# Patient Record
Sex: MALE | Race: WHITE | NOT HISPANIC OR LATINO | Employment: OTHER | URBAN - NONMETROPOLITAN AREA
[De-identification: names, ages, dates, MRNs, and addresses within clinical notes are randomized per-mention and may not be internally consistent; named-entity substitution may affect disease eponyms.]

---

## 2023-02-23 PROBLEM — M54.50 LUMBAGO: Status: ACTIVE | Noted: 2023-02-23

## 2023-02-23 PROBLEM — K21.9 GASTROESOPHAGEAL REFLUX DISEASE: Status: ACTIVE | Noted: 2023-02-23

## 2023-02-23 PROBLEM — I10 BENIGN ESSENTIAL HYPERTENSION: Status: ACTIVE | Noted: 2023-02-23

## 2023-02-23 PROBLEM — F17.210 CIGARETTE NICOTINE DEPENDENCE WITHOUT COMPLICATION: Status: ACTIVE | Noted: 2023-02-23

## 2023-02-23 PROBLEM — M54.2 NECK PAIN: Status: ACTIVE | Noted: 2023-02-23

## 2023-02-23 PROBLEM — G62.9 NEUROPATHY: Status: ACTIVE | Noted: 2023-02-23

## 2023-02-23 PROBLEM — M25.512 SHOULDER PAIN, LEFT: Status: ACTIVE | Noted: 2023-02-23

## 2023-02-23 PROBLEM — G47.33 OBSTRUCTIVE SLEEP APNEA: Status: ACTIVE | Noted: 2023-02-23

## 2023-02-23 PROBLEM — E03.9 HYPOTHYROIDISM: Status: ACTIVE | Noted: 2023-02-23

## 2023-02-23 PROBLEM — R63.4 ABNORMAL WEIGHT LOSS: Status: ACTIVE | Noted: 2023-02-23

## 2023-02-23 PROBLEM — G25.81 RESTLESS LEGS: Status: ACTIVE | Noted: 2023-02-23

## 2023-02-23 PROBLEM — E56.9 VITAMIN DEFICIENCY: Status: ACTIVE | Noted: 2023-02-23

## 2023-02-23 PROBLEM — M19.019 OSTEOARTHRITIS, SHOULDER: Status: ACTIVE | Noted: 2023-02-23

## 2023-02-23 PROBLEM — K76.89 NODULE ON LIVER: Status: ACTIVE | Noted: 2023-02-23

## 2023-02-23 PROBLEM — F17.200 NICOTINE DEPENDENCE: Status: ACTIVE | Noted: 2023-02-23

## 2023-02-23 PROBLEM — M25.529 ELBOW PAIN: Status: ACTIVE | Noted: 2023-02-23

## 2023-02-23 PROBLEM — K04.7 INFECTED TOOTH: Status: ACTIVE | Noted: 2023-02-23

## 2023-02-23 PROBLEM — R74.8 ELEVATED LIVER ENZYMES: Status: ACTIVE | Noted: 2023-02-23

## 2023-02-23 PROBLEM — M25.812 SHOULDER IMPINGEMENT, LEFT: Status: ACTIVE | Noted: 2023-02-23

## 2023-02-23 PROBLEM — R74.01 ELEVATED TRANSAMINASE LEVEL: Status: ACTIVE | Noted: 2023-02-23

## 2023-02-23 PROBLEM — I73.9 ASYMPTOMATIC PVD (PERIPHERAL VASCULAR DISEASE) (CMS-HCC): Status: ACTIVE | Noted: 2023-02-23

## 2023-02-23 PROBLEM — E55.9 MILD VITAMIN D DEFICIENCY: Status: ACTIVE | Noted: 2023-02-23

## 2023-02-23 PROBLEM — G47.09 OTHER INSOMNIA: Status: ACTIVE | Noted: 2023-02-23

## 2023-02-23 PROBLEM — K92.1 HEMATOCHEZIA: Status: ACTIVE | Noted: 2023-02-23

## 2023-02-23 PROBLEM — J44.9 CHRONIC OBSTRUCTIVE PULMONARY DISEASE (MULTI): Status: ACTIVE | Noted: 2023-02-23

## 2023-02-23 PROBLEM — E06.3 HASHIMOTO'S THYROIDITIS: Status: ACTIVE | Noted: 2023-02-23

## 2023-02-23 PROBLEM — F32.A DEPRESSION: Status: ACTIVE | Noted: 2023-02-23

## 2023-02-23 PROBLEM — E78.5 HYPERLIPIDEMIA: Status: ACTIVE | Noted: 2023-02-23

## 2023-02-23 PROBLEM — G47.00 INSOMNIA: Status: ACTIVE | Noted: 2023-02-23

## 2023-02-23 PROBLEM — R07.9 CHEST PAIN: Status: ACTIVE | Noted: 2023-02-23

## 2023-02-23 PROBLEM — I25.10 ASCVD (ARTERIOSCLEROTIC CARDIOVASCULAR DISEASE): Status: ACTIVE | Noted: 2023-02-23

## 2023-02-23 PROBLEM — K76.9 LIVER LESION: Status: ACTIVE | Noted: 2023-02-23

## 2023-02-23 PROBLEM — E04.2 NONTOXIC MULTINODULAR GOITER: Status: ACTIVE | Noted: 2023-02-23

## 2023-02-23 PROBLEM — R79.89 ABNORMAL THYROID BLOOD TEST: Status: ACTIVE | Noted: 2023-02-23

## 2023-02-23 PROBLEM — K29.70 GASTRITIS: Status: ACTIVE | Noted: 2023-02-23

## 2023-02-23 PROBLEM — R19.5 POSITIVE COLORECTAL CANCER SCREENING USING COLOGUARD TEST: Status: ACTIVE | Noted: 2023-02-23

## 2023-02-23 RX ORDER — ATORVASTATIN CALCIUM 80 MG/1
1 TABLET, FILM COATED ORAL NIGHTLY
COMMUNITY
End: 2023-03-09 | Stop reason: SDUPTHER

## 2023-02-23 RX ORDER — LEVOTHYROXINE SODIUM 25 UG/1
1 TABLET ORAL EVERY MORNING
COMMUNITY
End: 2023-03-09 | Stop reason: SDUPTHER

## 2023-02-23 RX ORDER — FLUTICASONE FUROATE, UMECLIDINIUM BROMIDE AND VILANTEROL TRIFENATATE 100; 62.5; 25 UG/1; UG/1; UG/1
1 POWDER RESPIRATORY (INHALATION) DAILY
COMMUNITY
End: 2023-03-09

## 2023-02-23 RX ORDER — AMLODIPINE BESYLATE 10 MG/1
1 TABLET ORAL DAILY
COMMUNITY
End: 2023-03-09 | Stop reason: SDUPTHER

## 2023-02-23 RX ORDER — LISINOPRIL 10 MG/1
1 TABLET ORAL DAILY
COMMUNITY
End: 2023-03-09 | Stop reason: SDUPTHER

## 2023-02-23 RX ORDER — NITROGLYCERIN 0.4 MG/1
0.4 TABLET SUBLINGUAL AS NEEDED
COMMUNITY

## 2023-02-23 RX ORDER — ASPIRIN 81 MG/1
1 TABLET ORAL DAILY
COMMUNITY
End: 2023-03-09 | Stop reason: SDUPTHER

## 2023-02-23 RX ORDER — ALBUTEROL SULFATE 90 UG/1
1 AEROSOL, METERED RESPIRATORY (INHALATION) EVERY 4 HOURS PRN
COMMUNITY
End: 2023-03-09

## 2023-02-24 PROBLEM — N17.9 ACUTE KIDNEY INJURY (CMS-HCC): Status: ACTIVE | Noted: 2023-02-24

## 2023-03-09 ENCOUNTER — OFFICE VISIT (OUTPATIENT)
Dept: PRIMARY CARE | Facility: CLINIC | Age: 63
End: 2023-03-09
Payer: MEDICARE

## 2023-03-09 VITALS
BODY MASS INDEX: 22.69 KG/M2 | TEMPERATURE: 97.8 F | SYSTOLIC BLOOD PRESSURE: 154 MMHG | RESPIRATION RATE: 18 BRPM | DIASTOLIC BLOOD PRESSURE: 86 MMHG | HEIGHT: 61 IN | HEART RATE: 83 BPM | OXYGEN SATURATION: 98 % | WEIGHT: 120.2 LBS

## 2023-03-09 DIAGNOSIS — F17.210 CIGARETTE NICOTINE DEPENDENCE WITHOUT COMPLICATION: ICD-10-CM

## 2023-03-09 DIAGNOSIS — J44.9 CHRONIC OBSTRUCTIVE PULMONARY DISEASE, UNSPECIFIED COPD TYPE (MULTI): ICD-10-CM

## 2023-03-09 DIAGNOSIS — G47.00 INSOMNIA, UNSPECIFIED TYPE: ICD-10-CM

## 2023-03-09 DIAGNOSIS — R49.0 HOARSENESS OF VOICE: ICD-10-CM

## 2023-03-09 DIAGNOSIS — R74.01 ELEVATED TRANSAMINASE LEVEL: ICD-10-CM

## 2023-03-09 DIAGNOSIS — N18.9 CHRONIC KIDNEY DISEASE, UNSPECIFIED CKD STAGE: ICD-10-CM

## 2023-03-09 DIAGNOSIS — I10 BENIGN ESSENTIAL HYPERTENSION: ICD-10-CM

## 2023-03-09 DIAGNOSIS — M19.019 OSTEOARTHRITIS OF SHOULDER, UNSPECIFIED LATERALITY, UNSPECIFIED OSTEOARTHRITIS TYPE: ICD-10-CM

## 2023-03-09 DIAGNOSIS — G25.81 RESTLESS LEGS: ICD-10-CM

## 2023-03-09 DIAGNOSIS — I73.9 ASYMPTOMATIC PVD (PERIPHERAL VASCULAR DISEASE) (CMS-HCC): ICD-10-CM

## 2023-03-09 DIAGNOSIS — E78.5 HYPERLIPIDEMIA, UNSPECIFIED HYPERLIPIDEMIA TYPE: ICD-10-CM

## 2023-03-09 DIAGNOSIS — I25.10 ASCVD (ARTERIOSCLEROTIC CARDIOVASCULAR DISEASE): ICD-10-CM

## 2023-03-09 DIAGNOSIS — Z00.00 HEALTH CARE MAINTENANCE: Primary | ICD-10-CM

## 2023-03-09 DIAGNOSIS — D18.03 LIVER HEMANGIOMA: ICD-10-CM

## 2023-03-09 DIAGNOSIS — K21.9 GASTROESOPHAGEAL REFLUX DISEASE, UNSPECIFIED WHETHER ESOPHAGITIS PRESENT: ICD-10-CM

## 2023-03-09 DIAGNOSIS — E03.9 HYPOTHYROIDISM, UNSPECIFIED TYPE: ICD-10-CM

## 2023-03-09 DIAGNOSIS — R19.5 POSITIVE COLORECTAL CANCER SCREENING USING COLOGUARD TEST: ICD-10-CM

## 2023-03-09 DIAGNOSIS — R06.02 SHORTNESS OF BREATH: ICD-10-CM

## 2023-03-09 DIAGNOSIS — F17.200 NICOTINE DEPENDENCE WITH CURRENT USE: ICD-10-CM

## 2023-03-09 PROCEDURE — 1036F TOBACCO NON-USER: CPT | Performed by: INTERNAL MEDICINE

## 2023-03-09 PROCEDURE — 3079F DIAST BP 80-89 MM HG: CPT | Performed by: INTERNAL MEDICINE

## 2023-03-09 PROCEDURE — 99214 OFFICE O/P EST MOD 30 MIN: CPT | Performed by: INTERNAL MEDICINE

## 2023-03-09 PROCEDURE — 3077F SYST BP >= 140 MM HG: CPT | Performed by: INTERNAL MEDICINE

## 2023-03-09 RX ORDER — ATORVASTATIN CALCIUM 80 MG/1
80 TABLET, FILM COATED ORAL NIGHTLY
Qty: 90 TABLET | Refills: 3 | Status: SHIPPED | OUTPATIENT
Start: 2023-03-09 | End: 2023-06-20 | Stop reason: SDUPTHER

## 2023-03-09 RX ORDER — AMLODIPINE BESYLATE 10 MG/1
10 TABLET ORAL DAILY
Qty: 30 TABLET | Refills: 2 | Status: SHIPPED | OUTPATIENT
Start: 2023-03-09 | End: 2023-05-03

## 2023-03-09 RX ORDER — TRAZODONE HYDROCHLORIDE 50 MG/1
50 TABLET ORAL NIGHTLY PRN
Qty: 30 TABLET | Refills: 0 | Status: SHIPPED | OUTPATIENT
Start: 2023-03-09 | End: 2023-04-17

## 2023-03-09 RX ORDER — ASPIRIN 81 MG/1
81 TABLET ORAL DAILY
Qty: 90 TABLET | Refills: 3 | Status: SHIPPED | OUTPATIENT
Start: 2023-03-09 | End: 2024-03-08

## 2023-03-09 RX ORDER — LEVOTHYROXINE SODIUM 25 UG/1
25 TABLET ORAL DAILY
Qty: 30 TABLET | Refills: 1 | Status: SHIPPED | OUTPATIENT
Start: 2023-03-09 | End: 2023-05-27

## 2023-03-09 RX ORDER — ALBUTEROL SULFATE 90 UG/1
2 AEROSOL, METERED RESPIRATORY (INHALATION) EVERY 4 HOURS PRN
Qty: 8.5 G | Refills: 5 | Status: SHIPPED | OUTPATIENT
Start: 2023-03-09 | End: 2023-06-20 | Stop reason: SDUPTHER

## 2023-03-09 RX ORDER — LISINOPRIL 10 MG/1
10 TABLET ORAL DAILY
Qty: 90 TABLET | Refills: 1 | Status: SHIPPED | OUTPATIENT
Start: 2023-03-09 | End: 2023-06-20 | Stop reason: SDUPTHER

## 2023-03-09 ASSESSMENT — PAIN SCALES - GENERAL: PAINLEVEL: 0-NO PAIN

## 2023-03-09 NOTE — PROGRESS NOTES
"Subjective   Patient ID:   Cain Kowalski is a 62 y.o. male, history of COPD, HTN/HLD, restless leg syndrome, CAD (s/p RAUL on 2/2013 to circumflex with moderate mid LAD stenosis), hypothyroidism (2/2 multinodular goiter), who presents for routine follow up (Medication refills )    CAD (s/p 2x Catherization, but per patient only have 1 stent)  - have been off medications  - technically should be on asa 81 mg qd,  atorvastatin 80 mg qd, amlodipine 10 mg every day, and lisinopril 10 mg qd   - does not follow cardiologist regularly (Dr. Montes)   - patient s/p 2 stroke and MI, went to [facility], then cath  - Chest pain initially presented as pressure, sitting on the chest on the left side, with radiation to the left hand  - currently with stable angina, unchanged  - patient have disability   - on PRN SL Nitro    GERD  - not always on Antacid for reflux  - have been off Famotidine 20 mg qd    Hypothyroidism  - Prior U/S with multinodular goiter  - have been on 25 mcg qd levothyroxine  - last TSH was significant elevated:   Lab Results   Component Value Date    .00 (H) 04/19/2022   - in the past, patient doesn't feel much difference while being on the treatment  - patient have gained weight since he stopped smoking, have stopped since 6/2020  - patient is on 25 mcg of levothyroxine, referred to endocrinology last visit but never gone  + patient also concerned that his thyroid gland have increased in size  + hoarse voice as well     COPD  - On Ventolin BID, take about 3 to 4 times a week   - unable to use Breo, made patient felt \"funky\"   - as per patient, Symbicort, and Breo didn't work     Restless leg syndrome and concern for PVD  - patient is not on mediation for restless leg syndrome  - patient with bilateral leg pain  - leg pain is worsening with exertion and relief with rest  - had normal ALLAN on 9/2019    Patient remained on tramadol  - for shoulder pain  - does not take pain medication daily   - current not " complaining of shoulder pain    CKD  - last creatinine was about 1.4     Positive Cologuard  - noted on record on 7/2020  - have not scheduled a colonoscopy    Transaminases/liver lesions  - noted transaminases, likely NAFLD however noted CT liver on 11/2021 with lesions that are increasing in size  - ordered liver MRI however have never been done    Complained of insomnia  - s/p trial of melatonin, doesn't work as per patient     All other (10) organ systems have been reviewed, negative for significant complaint and no change from baseline other than mentioned as per HPI above.    Patient Active Problem List   Diagnosis    ASCVD (arteriosclerotic cardiovascular disease)    Asymptomatic PVD (peripheral vascular disease) (CMS/HCC)    Benign essential hypertension    Chronic obstructive pulmonary disease (CMS/HCC)    Depression    Elevated transaminase level    Gastroesophageal reflux disease    Hyperlipidemia    Hypothyroidism    Liver hemangioma    Lumbago    Neuropathy    Cigarette nicotine dependence without complication    Nontoxic multinodular goiter    Obstructive sleep apnea    Osteoarthritis, shoulder    Insomnia    Restless legs    Positive colorectal cancer screening using Cologuard test    CKD (chronic kidney disease)    Health care maintenance    Hoarseness of voice        Past Surgical History:   Procedure Laterality Date    MR HEAD ANGIO WO IV CONTRAST  6/4/2014    MR HEAD ANGIO WO IV CONTRAST 6/4/2014 [facility]    MR NECK ANGIO WO IV CONTRAST  6/4/2014    MR NECK ANGIO WO IV CONTRAST 6/4/2014 [facility]    OTHER SURGICAL HISTORY  09/06/2018    Cardiac Cath Lesion 1, 1st Adjunct Treat Device: Stent       Family History   Problem Relation Name Age of Onset    Other (Cardiac disorder) Mother      Hypertension Mother      Other (Cardiac disorder) Father      Other (Cardiac disorder) Brother      Hypertension Brother         Social History    reports that he quit smoking about 3 years  ago. His smoking use included cigarettes. He started smoking about 53 years ago. He smoked an average of 1.5 packs per day. He has never used smokeless tobacco. He reports that he does not currently use alcohol. He reports that he does not currently use drugs after having used the following drugs: Marijuana.  No Known Allergies    quit smoking 2/2020, half a pack, smoking since 10 years old, occasionally cigar, denies EtoH, no illicit other than marijuana     Medication Documentation Review Audit       Reviewed by Marlyn Cool MD (Physician) on 03/11/23 at 1006      Medication Order Taking? Sig Documenting Provider Last Dose Status   albuterol (ProAir HFA) 90 mcg/actuation inhaler [id number]  Inhale 2 puffs every 4 hours if needed for wheezing or shortness of breath. Marlyn Cool MD  Active   Discontinued 03/09/23 1430   Discontinued 03/09/23 1450   amLODIPine (Norvasc) 10 mg tablet [id number]  Take 1 tablet (10 mg) by mouth once daily. For blood pressure Marlyn Cool MD  Active   Discontinued 03/09/23 1450   aspirin 81 mg EC tablet [id number]  Take 1 tablet (81 mg) by mouth once daily. With food. Marlyn Cool MD  Active   Discontinued 03/09/23 1450   atorvastatin (Lipitor) 80 mg tablet [id number]  Take 1 tablet (80 mg) by mouth once daily at bedtime. Marlyn Cool MD  Active   Discontinued 03/09/23 1430   Discontinued 03/09/23 1450   levothyroxine (Synthroid, Levoxyl) 25 mcg tablet [id number]  Take 1 tablet (25 mcg) by mouth once daily. Marlyn Cool MD  Active   Discontinued 03/09/23 1450   lisinopril 10 mg tablet [id number]  Take 1 tablet (10 mg) by mouth once daily. Marlyn Cool MD  Active   nitroglycerin (Nitrostat) 0.4 mg SL tablet [id number] No Place 1 tablet (0.4 mg) under the tongue if needed for chest pain. Historical Provider, MD Not Taking Active   traZODone (Desyrel) 50 mg tablet [id number]  Take 1 tablet (50 mg) by mouth as needed at bedtime for sleep. Marlyn Cool MD   "Active                     Objective       6/22/2020     1:14 PM 10/6/2020     9:53 AM 10/1/2021     3:57 PM 10/19/2021     3:15 PM 4/19/2022     8:08 AM 3/9/2023     2:11 PM   Vitals   Systolic 140 152 162 140 152 154   Diastolic 84 79 96 98 96 86   Heart Rate 96 79 65 82 93 83   Temp 36.4 °C (97.6 °F)  36.3 °C (97.4 °F) 36.6 °C (97.9 °F) 36.2 °C (97.2 °F) 36.6 °C (97.8 °F)   Resp 18  18  18 18   Height (in) 1.524 m (5') 1.549 m (5' 1\") 1.549 m (5' 1\")   1.549 m (5' 1\")   Weight (lb) 121.31 115 120 122.8 124 120.2   BMI 23.69 kg/m2 21.73 kg/m2 22.67 kg/m2 23.2 kg/m2 23.43 kg/m2 22.71 kg/m2   BSA (m2) 1.53 m2 1.5 m2 1.53 m2 1.55 m2 1.56 m2 1.53 m2   Visit Report      Report     Physical Exam  Constitutional:       General: He is not in acute distress.     Appearance: Normal appearance. He is not ill-appearing or toxic-appearing.   HENT:      Head: Normocephalic.   Eyes:      Extraocular Movements: Extraocular movements intact.      Conjunctiva/sclera: Conjunctivae normal.   Neck:      Comments: Unable to palpate thyroid , no obvious mass noted  Cardiovascular:      Rate and Rhythm: Normal rate and regular rhythm.      Heart sounds: Normal heart sounds. No murmur heard.     No friction rub. No gallop.   Pulmonary:      Effort: Pulmonary effort is normal. No respiratory distress.      Breath sounds: Normal breath sounds. No stridor. No wheezing, rhonchi or rales.      Comments: Noted decreased breath sounds   Abdominal:      General: Abdomen is flat. There is no distension.      Palpations: Abdomen is soft.      Tenderness: There is no abdominal tenderness. There is no guarding.   Musculoskeletal:         General: Normal range of motion.   Skin:     General: Skin is warm and dry.   Neurological:      General: No focal deficit present.      Mental Status: He is alert. Mental status is at baseline.         Assessment/Plan     Cain Kowalski is a 62 y.o. male, history of COPD, HTN/HLD, restless leg syndrome, CAD (s/p " RAUL on 2/2013 to circumflex with moderate mid LAD stenosis), hypothyroidism (2/2 multinodular goiter), who presents for routine follow up (Medication refills )    Problem List Items Addressed This Visit       ASCVD (arteriosclerotic cardiovascular disease)     - advised to take asa 81 mg qd, atorvastatin 80 mg qd   - c/w PRN SL Nitro  - restart amlodipine 10 mg qd,   - restart lisinopril 10 mg qd, will help with cardiac remodelling          Relevant Medications    atorvastatin (Lipitor) 80 mg tablet    aspirin 81 mg EC tablet    lisinopril 10 mg tablet    Other Relevant Orders    Lipid Panel    Asymptomatic PVD (peripheral vascular disease) (CMS/HCC)     - ALLAN ordered and the result was unremarkable         Relevant Orders    CBC and Auto Differential    Comprehensive Metabolic Panel    Benign essential hypertension     - Hypertensive during visit  - advised to take amlodipine 10 mg every day, lisinopril 10 mg every day,          Relevant Medications    amLODIPine (Norvasc) 10 mg tablet    lisinopril 10 mg tablet    Other Relevant Orders    CBC and Auto Differential    Comprehensive Metabolic Panel    Chronic obstructive pulmonary disease (CMS/HCC)     - c/w Ventolin BID  - given sample of Trelegy on prior visit, not improved as per patient   - ordered PFT         Relevant Medications    albuterol (ProAir HFA) 90 mcg/actuation inhaler    Other Relevant Orders    CT lung screening low dose    Pulmonary function testing    Elevated transaminase level     Will repeat CMP as above         Gastroesophageal reflux disease     - c/w Antacid for reflux PRN         Hyperlipidemia     - c/w atorvastatin 80 mg every day           Relevant Medications    atorvastatin (Lipitor) 80 mg tablet    Other Relevant Orders    Lipid Panel    Hypothyroidism     - will continue levothyroxine 25 mcg for now  - follow up TSH and Thyroid U/S   - referred to endocrinologist prior visit, never made appointment          Relevant Medications     levothyroxine (Synthroid, Levoxyl) 25 mcg tablet    Other Relevant Orders    US thyroid    Tsh With Reflex To Free T4 If Abnormal    Liver hemangioma     - noted on last MRI 5/5/2022  === 05/05/22 ===    MR LIVER WO AND W CONTRAST    - Impression -  Multiple hepatic hemangiomas. No suspicious hepatic lesions.  Otherwise unremarkable hepatobiliary system.           Relevant Orders    Comprehensive Metabolic Panel    Cigarette nicotine dependence without complication     - stopped smoking spring 2020         Relevant Medications    albuterol (ProAir HFA) 90 mcg/actuation inhaler    Other Relevant Orders    CT lung screening low dose    Pulmonary function testing    Osteoarthritis, shoulder     Right shoulder pain  - currently not complaining, will defer Tramadol          Insomnia     - c/w melatonin to 10 mg qhs PRN  - will do trial of Trazodone 50 mg qhs         Relevant Medications    traZODone (Desyrel) 50 mg tablet    Restless legs     Ordered iron panel          Relevant Orders    Ferritin    Iron and TIBC    Positive colorectal cancer screening using Cologuard test     Ordered colonoscopy          Relevant Orders    Colonoscopy    CKD (chronic kidney disease)     - if persist will consider nephrology referral  - creatinine stable at ~1.4  - FeNa of 1.3% , not on diuretic, likely intrinsic  - restart lisinopril, will maximize lisinopril at next visit  - consider SGLT2 as well at next visit  - consider nephrology referral next visit          Relevant Orders    CBC and Auto Differential    Comprehensive Metabolic Panel    Health care maintenance - Primary     Health Care Maintenance 3/9/23  Asa: on 81 mg qd  Diabetes Screening: ordered CMP  Lipid screening: ordered  STD/HIV Screening: declined  Lung Cancer Screening: last was 10/2021  Hepatitis C Screening: negative 2014  AAA Screening: not due until 65 years of age  Colonoscopy: positive cologuard , colonoscopy ordered  Vaccination: s/p J&J on 5/1/21, advised to  get booster, s/p PPSV23 at age of 52, ordered shingrix on prior visit         Hoarseness of voice     Given chronic hoarse voice, in the setting of tobacco use, will need to rule out malignancy, referral to ENT as above for direct visualization  Follow up thyroid ultrasound          Relevant Orders    US thyroid    Referral to ENT     Other Visit Diagnoses       Nicotine dependence with current use        Relevant Medications    albuterol (ProAir HFA) 90 mcg/actuation inhaler    Other Relevant Orders    CT lung screening low dose    Shortness of breath        Relevant Medications    albuterol (ProAir HFA) 90 mcg/actuation inhaler    Other Relevant Orders    Pulmonary function testing        Follow up in 3 months

## 2023-03-11 PROBLEM — M25.812 SHOULDER IMPINGEMENT, LEFT: Status: RESOLVED | Noted: 2023-02-23 | Resolved: 2023-03-11

## 2023-03-11 PROBLEM — M25.529 ELBOW PAIN: Status: RESOLVED | Noted: 2023-02-23 | Resolved: 2023-03-11

## 2023-03-11 PROBLEM — M25.512 SHOULDER PAIN, LEFT: Status: RESOLVED | Noted: 2023-02-23 | Resolved: 2023-03-11

## 2023-03-11 PROBLEM — E55.9 MILD VITAMIN D DEFICIENCY: Status: RESOLVED | Noted: 2023-02-23 | Resolved: 2023-03-11

## 2023-03-11 PROBLEM — N17.9 ACUTE KIDNEY INJURY (CMS-HCC): Status: RESOLVED | Noted: 2023-02-24 | Resolved: 2023-03-11

## 2023-03-11 PROBLEM — M54.2 NECK PAIN: Status: RESOLVED | Noted: 2023-02-23 | Resolved: 2023-03-11

## 2023-03-11 PROBLEM — R74.8 ELEVATED LIVER ENZYMES: Status: RESOLVED | Noted: 2023-02-23 | Resolved: 2023-03-11

## 2023-03-11 PROBLEM — R79.89 ABNORMAL THYROID BLOOD TEST: Status: RESOLVED | Noted: 2023-02-23 | Resolved: 2023-03-11

## 2023-03-11 PROBLEM — R63.4 ABNORMAL WEIGHT LOSS: Status: RESOLVED | Noted: 2023-02-23 | Resolved: 2023-03-11

## 2023-03-11 PROBLEM — K29.70 GASTRITIS: Status: RESOLVED | Noted: 2023-02-23 | Resolved: 2023-03-11

## 2023-03-11 PROBLEM — K76.89 NODULE ON LIVER: Status: RESOLVED | Noted: 2023-02-23 | Resolved: 2023-03-11

## 2023-03-11 PROBLEM — R49.0 HOARSENESS OF VOICE: Status: ACTIVE | Noted: 2023-03-11

## 2023-03-11 PROBLEM — E56.9 VITAMIN DEFICIENCY: Status: RESOLVED | Noted: 2023-02-23 | Resolved: 2023-03-11

## 2023-03-11 PROBLEM — E06.3 HASHIMOTO'S THYROIDITIS: Status: RESOLVED | Noted: 2023-02-23 | Resolved: 2023-03-11

## 2023-03-11 PROBLEM — G47.09 OTHER INSOMNIA: Status: RESOLVED | Noted: 2023-02-23 | Resolved: 2023-03-11

## 2023-03-11 PROBLEM — Z00.00 HEALTH CARE MAINTENANCE: Status: ACTIVE | Noted: 2023-03-11

## 2023-03-11 PROBLEM — R07.9 CHEST PAIN: Status: RESOLVED | Noted: 2023-02-23 | Resolved: 2023-03-11

## 2023-03-11 PROBLEM — F17.200 NICOTINE DEPENDENCE: Status: RESOLVED | Noted: 2023-02-23 | Resolved: 2023-03-11

## 2023-03-11 PROBLEM — D18.03 LIVER HEMANGIOMA: Status: ACTIVE | Noted: 2023-02-23

## 2023-03-11 PROBLEM — K04.7 INFECTED TOOTH: Status: RESOLVED | Noted: 2023-02-23 | Resolved: 2023-03-11

## 2023-03-11 PROBLEM — N18.9 CKD (CHRONIC KIDNEY DISEASE): Status: ACTIVE | Noted: 2023-03-11

## 2023-03-11 PROBLEM — K92.1 HEMATOCHEZIA: Status: RESOLVED | Noted: 2023-02-23 | Resolved: 2023-03-11

## 2023-03-11 NOTE — ASSESSMENT & PLAN NOTE
Given chronic hoarse voice, in the setting of tobacco use, will need to rule out malignancy, referral to ENT as above for direct visualization  Follow up thyroid ultrasound

## 2023-03-11 NOTE — ASSESSMENT & PLAN NOTE
- noted on last MRI 5/5/2022  === 05/05/22 ===    MR LIVER WO AND W CONTRAST    - Impression -  Multiple hepatic hemangiomas. No suspicious hepatic lesions.  Otherwise unremarkable hepatobiliary system.

## 2023-03-11 NOTE — ASSESSMENT & PLAN NOTE
- if persist will consider nephrology referral  - creatinine stable at ~1.4  - FeNa of 1.3% , not on diuretic, likely intrinsic  - restart lisinopril, will maximize lisinopril at next visit  - consider SGLT2 as well at next visit  - consider nephrology referral next visit

## 2023-03-11 NOTE — ASSESSMENT & PLAN NOTE
- Hypertensive during visit  - advised to take amlodipine 10 mg every day, lisinopril 10 mg every day,

## 2023-03-11 NOTE — ASSESSMENT & PLAN NOTE
Health Care Maintenance 3/9/23  Asa: on 81 mg qd  Diabetes Screening: ordered CMP  Lipid screening: ordered  STD/HIV Screening: declined  Lung Cancer Screening: last was 10/2021  Hepatitis C Screening: negative 2014  AAA Screening: not due until 65 years of age  Colonoscopy: positive cologuard , colonoscopy ordered  Vaccination: s/p J&J on 5/1/21, advised to get booster, s/p PPSV23 at age of 52, ordered shingrix on prior visit

## 2023-03-11 NOTE — ASSESSMENT & PLAN NOTE
- c/w Antacid for reflux PRN   Pt arrives from EMS with c/o dizziness and lightheadedness for the past 2 hours. EMS reports the pt's home health nurse called EMS for these symptoms. Per EMS, pt may have taken his nightly beta blockers this morning instead of last night. Per EMS, pt had a heart attack in the past but is unsure of when. Pt's home health nurse also mentioned the pt's gait is \"off\" and had some slurred speech but none is noted upon admission. Pt has a hx of dementia but is A&Ox4 for me.

## 2023-03-11 NOTE — ASSESSMENT & PLAN NOTE
- advised to take asa 81 mg qd, atorvastatin 80 mg qd   - c/w PRN SL Nitro  - restart amlodipine 10 mg qd,   - restart lisinopril 10 mg qd, will help with cardiac remodelling

## 2023-03-11 NOTE — ASSESSMENT & PLAN NOTE
- c/w Ventolin BID  - given sample of Trelegy on prior visit, not improved as per patient   - ordered PFT   Duration Of Freeze Thaw-Cycle (Seconds): 3 Post-Care Instructions: I reviewed with the patient in detail post-care instructions. Patient is to wear sunprotection, and avoid picking at any of the treated lesions. Pt may apply Vaseline to crusted or scabbing areas. Consent: The patient's consent was obtained including but not limited to risks of crusting, scabbing, blistering, scarring, darker or lighter pigmentary change, recurrence, incomplete removal and infection. Number Of Freeze-Thaw Cycles: 2 freeze-thaw cycles Detail Level: Detailed Render Post-Care Instructions In Note?: no Add 11434 Cpt? (Important Note: In 2017 The Use Of 33078 Is Being Tracked By Cms To Determine Future Global Period Reimbursement For Global Periods): yes Detail Level: Detailed

## 2023-03-11 NOTE — ASSESSMENT & PLAN NOTE
- will continue levothyroxine 25 mcg for now  - follow up TSH and Thyroid U/S   - referred to endocrinologist prior visit, never made appointment

## 2023-03-24 ENCOUNTER — TELEPHONE (OUTPATIENT)
Dept: PRIMARY CARE | Facility: CLINIC | Age: 63
End: 2023-03-24
Payer: MEDICARE

## 2023-03-24 DIAGNOSIS — E06.5 CHRONIC THYROIDITIS: Primary | ICD-10-CM

## 2023-03-24 DIAGNOSIS — I25.10 ASCVD (ARTERIOSCLEROTIC CARDIOVASCULAR DISEASE): Primary | ICD-10-CM

## 2023-03-24 NOTE — RESULT ENCOUNTER NOTE
No suspicious nodule on the CT scan screening  however noted severe coronary calcification, patient is a high risk for worsening heart disease, let's do referral to cardiology

## 2023-03-24 NOTE — TELEPHONE ENCOUNTER
Imaging showed chronic inflammation to the thyroid glands, no specific etiology known, let's do referral to endocrinologist for 2nd opinion , order placed

## 2023-03-24 NOTE — TELEPHONE ENCOUNTER
No suspicious nodule on the CT scan screening  however noted severe coronary calcification, patient is a high risk for heart disease, let's do referral to cardiology

## 2023-04-15 DIAGNOSIS — G47.00 INSOMNIA, UNSPECIFIED TYPE: ICD-10-CM

## 2023-04-17 RX ORDER — TRAZODONE HYDROCHLORIDE 50 MG/1
TABLET ORAL
Qty: 30 TABLET | Refills: 0 | Status: SHIPPED | OUTPATIENT
Start: 2023-04-17 | End: 2023-05-27

## 2023-05-02 DIAGNOSIS — I10 BENIGN ESSENTIAL HYPERTENSION: ICD-10-CM

## 2023-05-03 RX ORDER — AMLODIPINE BESYLATE 10 MG/1
TABLET ORAL
Qty: 90 TABLET | Refills: 2 | Status: SHIPPED | OUTPATIENT
Start: 2023-05-03 | End: 2023-06-20 | Stop reason: ALTCHOICE

## 2023-05-26 DIAGNOSIS — G47.00 INSOMNIA, UNSPECIFIED TYPE: ICD-10-CM

## 2023-05-26 DIAGNOSIS — E03.9 HYPOTHYROIDISM, UNSPECIFIED TYPE: ICD-10-CM

## 2023-05-27 RX ORDER — TRAZODONE HYDROCHLORIDE 50 MG/1
TABLET ORAL
Qty: 30 TABLET | Refills: 0 | Status: SHIPPED | OUTPATIENT
Start: 2023-05-27 | End: 2023-06-20 | Stop reason: SDUPTHER

## 2023-05-27 RX ORDER — LEVOTHYROXINE SODIUM 25 UG/1
TABLET ORAL
Qty: 30 TABLET | Refills: 1 | Status: SHIPPED | OUTPATIENT
Start: 2023-05-27 | End: 2023-06-20 | Stop reason: SDUPTHER

## 2023-06-20 ENCOUNTER — OFFICE VISIT (OUTPATIENT)
Dept: PRIMARY CARE | Facility: CLINIC | Age: 63
End: 2023-06-20
Payer: MEDICARE

## 2023-06-20 VITALS
SYSTOLIC BLOOD PRESSURE: 126 MMHG | WEIGHT: 114 LBS | OXYGEN SATURATION: 97 % | RESPIRATION RATE: 18 BRPM | BODY MASS INDEX: 21.54 KG/M2 | HEART RATE: 68 BPM | DIASTOLIC BLOOD PRESSURE: 82 MMHG

## 2023-06-20 DIAGNOSIS — R74.01 ELEVATED TRANSAMINASE LEVEL: ICD-10-CM

## 2023-06-20 DIAGNOSIS — G25.81 RESTLESS LEGS: ICD-10-CM

## 2023-06-20 DIAGNOSIS — J44.9 CHRONIC OBSTRUCTIVE PULMONARY DISEASE, UNSPECIFIED COPD TYPE (MULTI): ICD-10-CM

## 2023-06-20 DIAGNOSIS — F17.200 NICOTINE DEPENDENCE WITH CURRENT USE: ICD-10-CM

## 2023-06-20 DIAGNOSIS — I25.10 ASCVD (ARTERIOSCLEROTIC CARDIOVASCULAR DISEASE): ICD-10-CM

## 2023-06-20 DIAGNOSIS — N18.9 CHRONIC KIDNEY DISEASE, UNSPECIFIED CKD STAGE: ICD-10-CM

## 2023-06-20 DIAGNOSIS — I73.9 ASYMPTOMATIC PVD (PERIPHERAL VASCULAR DISEASE) (CMS-HCC): ICD-10-CM

## 2023-06-20 DIAGNOSIS — E78.5 HYPERLIPIDEMIA, UNSPECIFIED HYPERLIPIDEMIA TYPE: ICD-10-CM

## 2023-06-20 DIAGNOSIS — G47.33 OBSTRUCTIVE SLEEP APNEA: ICD-10-CM

## 2023-06-20 DIAGNOSIS — I10 BENIGN ESSENTIAL HYPERTENSION: ICD-10-CM

## 2023-06-20 DIAGNOSIS — M19.019 OSTEOARTHRITIS OF SHOULDER, UNSPECIFIED LATERALITY, UNSPECIFIED OSTEOARTHRITIS TYPE: ICD-10-CM

## 2023-06-20 DIAGNOSIS — K21.9 GASTROESOPHAGEAL REFLUX DISEASE, UNSPECIFIED WHETHER ESOPHAGITIS PRESENT: ICD-10-CM

## 2023-06-20 DIAGNOSIS — E03.9 HYPOTHYROIDISM, UNSPECIFIED TYPE: ICD-10-CM

## 2023-06-20 DIAGNOSIS — D18.03 LIVER HEMANGIOMA: ICD-10-CM

## 2023-06-20 DIAGNOSIS — Z00.00 HEALTH CARE MAINTENANCE: ICD-10-CM

## 2023-06-20 DIAGNOSIS — R49.0 HOARSENESS OF VOICE: ICD-10-CM

## 2023-06-20 DIAGNOSIS — F17.210 CIGARETTE NICOTINE DEPENDENCE WITHOUT COMPLICATION: ICD-10-CM

## 2023-06-20 DIAGNOSIS — R19.5 POSITIVE COLORECTAL CANCER SCREENING USING COLOGUARD TEST: Primary | ICD-10-CM

## 2023-06-20 DIAGNOSIS — K92.1 HEMATOCHEZIA: ICD-10-CM

## 2023-06-20 DIAGNOSIS — G47.00 INSOMNIA, UNSPECIFIED TYPE: ICD-10-CM

## 2023-06-20 DIAGNOSIS — E04.2 NONTOXIC MULTINODULAR GOITER: ICD-10-CM
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
## 2023-06-20 PROCEDURE — 3074F SYST BP LT 130 MM HG: CPT | Performed by: INTERNAL MEDICINE

## 2023-06-20 PROCEDURE — 3079F DIAST BP 80-89 MM HG: CPT | Performed by: INTERNAL MEDICINE

## 2023-06-20 PROCEDURE — 99214 OFFICE O/P EST MOD 30 MIN: CPT | Performed by: INTERNAL MEDICINE

## 2023-06-20 PROCEDURE — 1036F TOBACCO NON-USER: CPT | Performed by: INTERNAL MEDICINE

## 2023-06-20 RX ORDER — ALBUTEROL SULFATE 90 UG/1
2 AEROSOL, METERED RESPIRATORY (INHALATION) EVERY 4 HOURS PRN
Qty: 8.5 G | Refills: 5 | Status: SHIPPED | OUTPATIENT
Start: 2023-06-20 | End: 2024-06-19

## 2023-06-20 RX ORDER — ATORVASTATIN CALCIUM 80 MG/1
80 TABLET, FILM COATED ORAL NIGHTLY
Qty: 90 TABLET | Refills: 3 | Status: SHIPPED | OUTPATIENT
Start: 2023-06-20 | End: 2024-06-19

## 2023-06-20 RX ORDER — LISINOPRIL 10 MG/1
10 TABLET ORAL DAILY
Qty: 90 TABLET | Refills: 3 | Status: SHIPPED | OUTPATIENT
Start: 2023-06-20

## 2023-06-20 RX ORDER — LEVOTHYROXINE SODIUM 25 UG/1
25 TABLET ORAL DAILY
Qty: 90 TABLET | Refills: 1 | Status: SHIPPED | OUTPATIENT
Start: 2023-06-20 | End: 2023-12-17

## 2023-06-20 RX ORDER — TRAZODONE HYDROCHLORIDE 50 MG/1
50 TABLET ORAL NIGHTLY PRN
Qty: 90 TABLET | Refills: 3 | Status: SHIPPED | OUTPATIENT
Start: 2023-06-20 | End: 2024-06-19

## 2023-06-20 NOTE — ASSESSMENT & PLAN NOTE
Given chronic hoarse voice, in the setting of tobacco use, will need to rule out malignancy, referral to ENT on prior visit, however never gone

## 2023-06-20 NOTE — ASSESSMENT & PLAN NOTE
Normotensive during visit  Ordered lisinopril 10 mg every day , for CKD protection  However patient with hematochezia, will follow CBC to ensure patient is not anemic   Will stop amlodipine at this time

## 2023-06-20 NOTE — ASSESSMENT & PLAN NOTE
- noted on last MRI 5/5/2022  === 05/05/22 ===    MR LIVER WO AND W CONTRAST    - Impression -  Multiple hepatic hemangiomas. No suspicious hepatic lesions.  Otherwise unremarkable hepatobiliary system.    Follow up CMP as noted below

## 2023-06-20 NOTE — ASSESSMENT & PLAN NOTE
S/p ultrasound on 3/2023 - noted no suspicious lesion, however chronic thyroiditis  Advised to follow up endocrinologist , referral given prior visit

## 2023-06-20 NOTE — PROGRESS NOTES
"Subjective   Patient ID:   Cain Kowalski is a 63 y.o. male, history of COPD, HTN/HLD, restless leg syndrome, CAD (s/p RAUL on 2/2013 to circumflex with moderate mid LAD stenosis), hypothyroidism (2/2 multinodular goiter), who presents for Follow-up    CAD (s/p 2x Catherization, but per patient only have 1 stent)  - on asa 81 mg every day   - on  atorvastatin 80 mg qd,   - only take one blood pressure medicine, unsure if it was amlodipine 10 mg every day, and lisinopril 10 mg qd   - does not follow cardiologist regularly (Dr. Montes)   - patient s/p 2 stroke and MI, went to [facility], then cath  - Chest pain initially presented as pressure, sitting on the chest on the left side, with radiation to the left hand  - currently with stable angina, unchanged  - patient have disability   - on PRN SL Nitro    GERD  - not always on Antacid for reflux  - have been off Famotidine 20 mg qd    Hypothyroidism  - Prior U/S with multinodular goiter  - have been on 25 mcg qd levothyroxine  - last TSH was significant elevated:   Lab Results   Component Value Date    .00 (H) 04/19/2022   - in the past, patient doesn't feel much difference while being on the treatment  - patient have gained weight since he stopped smoking, have stopped since 6/2020  - patient is on 25 mcg of levothyroxine, referred to endocrinology last visit but never gone  - had ultrasound of thyroid on 3/9/23, noted chronic thyroiditis but no nodule seen    COPD  - On Ventolin BID, take about 3 to 4 times a week   - unable to use Breo, made patient felt \"funky\"   - as per patient, Symbicort, and Breo didn't work     Restless leg syndrome and concern for PVD  - patient is not on mediation for restless leg syndrome  - patient with bilateral leg pain  - leg pain is worsening with exertion and relief with rest  - had normal ALLAN on 9/2019    Patient remained on tramadol  - for shoulder pain  - does not take pain medication daily   - current not complaining of " shoulder pain    CKD  - last creatinine was about 1.4     Positive Cologuard  - noted on record on 7/2020  - have not scheduled a colonoscopy    Transaminases/liver lesions  - noted transaminases, likely NAFLD however noted CT liver on 11/2021 with lesions that are increasing in size  - noted MRI on 5/5/22 with multiple hemangioma, no suspicious hepatic lesions    Complained of insomnia  - s/p trial of melatonin, doesn't work as per patient   - on trazodone 50 mg every day PRN and working well     Concern of hematochezia   - ongoing for at least 4 times  - ongoing since 6/12/23  - bright red blood on the 1st three days   - currently still have some blood but not as bright as it was , but noted some dark stool    - denies any pain    Head trauma  - pateint was coughing a lot and have lightheadedness  - patient then fall and hit the back of the head  - didn't present to the ER  - denies pain at this time    All other (10) organ systems have been reviewed, negative for significant complaint and no change from baseline other than mentioned as per HPI above.    Patient Active Problem List   Diagnosis    ASCVD (arteriosclerotic cardiovascular disease)    Asymptomatic PVD (peripheral vascular disease) (CMS/HCC)    Benign essential hypertension    Chronic obstructive pulmonary disease (CMS/HCC)    Depression    Elevated transaminase level    Gastroesophageal reflux disease    Hematochezia    Hyperlipidemia    Hypothyroidism    Liver hemangioma    Lumbago    Neuropathy    Nicotine dependence with current use    Nontoxic multinodular goiter    Obstructive sleep apnea    Osteoarthritis, shoulder    Insomnia    Restless legs    Positive colorectal cancer screening using Cologuard test    CKD (chronic kidney disease)    Health care maintenance    Hoarseness of voice    Fall        Past Surgical History:   Procedure Laterality Date    MR HEAD ANGIO WO IV CONTRAST  6/4/2014    MR HEAD ANGIO WO IV CONTRAST 6/4/2014 [facility]    MR NECK ANGIO WO IV CONTRAST  6/4/2014    MR NECK ANGIO WO IV CONTRAST 6/4/2014 [facility]    OTHER SURGICAL HISTORY  09/06/2018    Cardiac Cath Lesion 1, 1st Adjunct Treat Device: Stent       Family History   Problem Relation Name Age of Onset    Other (Cardiac disorder) Mother      Hypertension Mother      Other (Cardiac disorder) Father      Other (Cardiac disorder) Brother      Hypertension Brother         Social History    reports that he quit smoking about 3 years ago. His smoking use included cigarettes. He started smoking about 53 years ago. He smoked an average of 1.5 packs per day. He has never used smokeless tobacco. He reports that he does not currently use alcohol. He reports that he does not currently use drugs after having used the following drugs: Marijuana.  No Known Allergies    quit smoking 2/2020, half a pack, smoking since 10 years old, occasionally cigar, denies EtoH, no illicit other than marijuana     Medication Documentation Review Audit       Reviewed by Marlyn Cool MD (Physician) on 06/20/23 at 2027      Medication Order Taking? Sig Documenting Provider Last Dose Status   Discontinued 06/20/23 1340   albuterol (ProAir HFA) 90 mcg/actuation inhaler [id number]  Inhale 2 puffs every 4 hours if needed for wheezing or shortness of breath. Marlyn Cool MD  Active   Discontinued 06/20/23 1332   aspirin 81 mg EC tablet [id number] Yes Take 1 tablet (81 mg) by mouth once daily. With food. Marlyn Cool MD Taking Active   Discontinued 06/20/23 1340   atorvastatin (Lipitor) 80 mg tablet [id number]  Take 1 tablet (80 mg) by mouth once daily at bedtime. Marlyn Cool MD  Active   Discontinued 06/20/23 1340   levothyroxine (Synthroid, Levoxyl) 25 mcg tablet [id number]  Take 1 tablet (25 mcg) by mouth once daily. Marlyn Cool MD  Active   Discontinued 06/20/23 1340   lisinopril 10 mg tablet [id number]  Take 1 tablet (10 mg) by mouth once daily. Marlyn Cool MD   "Active   nitroglycerin (Nitrostat) 0.4 mg SL tablet [id number] Yes Place 1 tablet (0.4 mg) under the tongue if needed for chest pain. Historical Provider, MD Taking Active   Discontinued 06/20/23 1340   traZODone (Desyrel) 50 mg tablet [id number]  Take 1 tablet (50 mg) by mouth as needed at bedtime for sleep. Marlyn Cool MD  Active                     Objective       6/22/2020     1:14 PM 10/6/2020     9:53 AM 10/1/2021     3:57 PM 10/19/2021     3:15 PM 4/19/2022     8:08 AM 3/9/2023     2:11 PM 6/20/2023     1:06 PM   Vitals   Systolic 140 152 162 140 152 154 126   Diastolic 84 79 96 98 96 86 82   Heart Rate 96 79 65 82 93 83 68   Temp 36.4 °C (97.6 °F)  36.3 °C (97.4 °F) 36.6 °C (97.9 °F) 36.2 °C (97.2 °F) 36.6 °C (97.8 °F)    Resp 18  18  18 18 18   Height (in) 1.524 m (5') 1.549 m (5' 1\") 1.549 m (5' 1\")   1.549 m (5' 1\")    Weight (lb) 121.31 115 120 122.8 124 120.2 114   BMI 23.69 kg/m2 21.73 kg/m2 22.67 kg/m2 23.2 kg/m2 23.43 kg/m2 22.71 kg/m2 21.54 kg/m2   BSA (m2) 1.53 m2 1.5 m2 1.53 m2 1.55 m2 1.56 m2 1.53 m2 1.49 m2   Visit Report      Report Report     Physical Exam  Constitutional:       General: He is not in acute distress.     Appearance: Normal appearance. He is not ill-appearing or toxic-appearing.   HENT:      Head: Normocephalic.   Eyes:      Extraocular Movements: Extraocular movements intact.      Conjunctiva/sclera: Conjunctivae normal.   Neck:      Comments: Unable to palpate thyroid , no obvious mass noted  Cardiovascular:      Rate and Rhythm: Normal rate and regular rhythm.      Heart sounds: Normal heart sounds. No murmur heard.     No friction rub. No gallop.   Pulmonary:      Effort: Pulmonary effort is normal. No respiratory distress.      Breath sounds: Normal breath sounds. No stridor. No wheezing, rhonchi or rales.      Comments: Noted decreased breath sounds   Abdominal:      General: Abdomen is flat. There is no distension.      Palpations: Abdomen is soft.      Tenderness: " There is no abdominal tenderness. There is no guarding.   Musculoskeletal:         General: Normal range of motion.   Skin:     General: Skin is warm and dry.   Neurological:      General: No focal deficit present.      Mental Status: He is alert. Mental status is at baseline.         Assessment/Plan     Cain Kowalski is a 63 y.o. male, history of COPD, HTN/HLD, restless leg syndrome, CAD (s/p RAUL on 2/2013 to circumflex with moderate mid LAD stenosis), hypothyroidism (2/2 multinodular goiter), who presents for Follow-up    Problem List Items Addressed This Visit       ASCVD (arteriosclerotic cardiovascular disease)     - advised to take asa 81 mg qd, atorvastatin 80 mg qd   - c/w PRN SL Nitro  - restart lisinopril 10 mg qd, will help with cardiac remodelling as well as CKD  - however patient is currently normotensive, advised if patient have dizziness then to stop the medication         Relevant Medications    atorvastatin (Lipitor) 80 mg tablet    lisinopril 10 mg tablet    Asymptomatic PVD (peripheral vascular disease) (CMS/HCC)     - ALLAN ordered and the result was unremarkable         Benign essential hypertension     Normotensive during visit  Ordered lisinopril 10 mg every day , for CKD protection  However patient with hematochezia, will follow CBC to ensure patient is not anemic   Will stop amlodipine at this time         Relevant Medications    lisinopril 10 mg tablet    Chronic obstructive pulmonary disease (CMS/HCC)     - c/w Ventolin BID  - given sample of Trelegy on prior visit, not improved as per patient   - ordered PFT but never done, -reordered         Relevant Medications    albuterol (ProAir HFA) 90 mcg/actuation inhaler    Other Relevant Orders    Pulmonary function testing    Elevated transaminase level     Will repeat CMP as noted below         Gastroesophageal reflux disease     - c/w Antacid for reflux PRN         Hematochezia     Patient with concerning history of hematochezia  However,  currently hemodynamically stable, no dizziness and no lightheadedness,  However, patient is normotensive at this time, typically hypertension  Will check lab as noted below , rule out anemia  Advised if patient hematochezia continues then advised to present to the ER  Patient with positive cologuard, however could be false positive from blood  Denies any pain, less likely to be external hemorrhoids, could be internal hemorrhoid, however will need colonoscopy to rule out malignancy , will defer exam at this time as it will not   Also advised if patient have lightheadedness then to also present to the ER          Relevant Orders    CBC and Auto Differential    Comprehensive Metabolic Panel    Ferritin    Iron and TIBC    Colonoscopy    Hyperlipidemia     - c/w atorvastatin 80 mg every day           Relevant Medications    atorvastatin (Lipitor) 80 mg tablet    Other Relevant Orders    Lipid Panel    Hypothyroidism     - will continue levothyroxine 25 mcg for now  - follow up TSH   - referred to endocrinologist prior visit, never made appointment          Relevant Medications    levothyroxine (Synthroid, Levoxyl) 25 mcg tablet    Other Relevant Orders    Tsh With Reflex To Free T4 If Abnormal    Liver hemangioma     - noted on last MRI 5/5/2022  === 05/05/22 ===    MR LIVER WO AND W CONTRAST    - Impression -  Multiple hepatic hemangiomas. No suspicious hepatic lesions.  Otherwise unremarkable hepatobiliary system.    Follow up CMP as noted below           Nicotine dependence with current use     - stopped smoking spring 2020         Relevant Medications    albuterol (ProAir HFA) 90 mcg/actuation inhaler    Nontoxic multinodular goiter     S/p ultrasound on 3/2023 - noted no suspicious lesion, however chronic thyroiditis  Advised to follow up endocrinologist , referral given prior visit          Obstructive sleep apnea     No sleep study noted  in the chart  Will inquire next visit           Osteoarthritis, shoulder     Right shoulder pain  - currently not complaining, will defer Tramadol          Insomnia     - c/w melatonin to 10 mg qhs PRN  -  c/w Trazodone 50 mg qhs         Relevant Medications    traZODone (Desyrel) 50 mg tablet    Restless legs     Ordered iron panel          Relevant Orders    Iron and TIBC    Positive colorectal cancer screening using Cologuard test - Primary     Re-Ordered colonoscopy          Relevant Orders    Comprehensive Metabolic Panel    Ferritin    Iron and TIBC    Colonoscopy    CKD (chronic kidney disease)     - follow up CMP  - FeNa of 1.3% , not on diuretic, likely intrinsic  - restart lisinopril  - consider SGLT2 as well at next visit  - consider nephrology referral next visit          Relevant Orders    Iron and TIBC    Health care maintenance     Health Care Maintenance 3/9/23  Asa: on 81 mg qd  Diabetes Screening: ordered CMP  Lipid screening: ordered  STD/HIV Screening: declined  Lung Cancer Screening: last was 3/2023:    1. 3 mm right upper lobe nodule, likely benign. Continued screening with low-dose noncontrast chest CT in 12 months (from current date) is recommended.  2. Severe coronary artery calcifications are seen. Correlate with coronary artery disease risk factors.  3. Other findings as described above    Hepatitis C Screening: negative 2014  AAA Screening: not due until 65 years of age  Colonoscopy: positive cologuard , colonoscopy ordered  Vaccination: s/p J&J on 5/1/21, advised to get booster, s/p PPSV23 at age of 52, ordered shingrix on prior visit         Hoarseness of voice     Given chronic hoarse voice, in the setting of tobacco use, will need to rule out malignancy, referral to ENT on prior visit, however never gone         Fall     Currently asymptomatic from head trauma/fall  Not anticoagulated  Will observe for now           Follow up pending lab result   No future appointments.

## 2023-06-20 NOTE — ASSESSMENT & PLAN NOTE
- follow up CMP  - FeNa of 1.3% , not on diuretic, likely intrinsic  - restart lisinopril  - consider SGLT2 as well at next visit  - consider nephrology referral next visit

## 2023-06-20 NOTE — ASSESSMENT & PLAN NOTE
Health Care Maintenance 3/9/23  Asa: on 81 mg qd  Diabetes Screening: ordered CMP  Lipid screening: ordered  STD/HIV Screening: declined  Lung Cancer Screening: last was 3/2023:    1. 3 mm right upper lobe nodule, likely benign. Continued screening with low-dose noncontrast chest CT in 12 months (from current date) is recommended.  2. Severe coronary artery calcifications are seen. Correlate with coronary artery disease risk factors.  3. Other findings as described above    Hepatitis C Screening: negative 2014  AAA Screening: not due until 65 years of age  Colonoscopy: positive cologuard , colonoscopy ordered  Vaccination: s/p J&J on 5/1/21, advised to get booster, s/p PPSV23 at age of 52, ordered shingrix on prior visit

## 2023-06-20 NOTE — ASSESSMENT & PLAN NOTE
- c/w Ventolin BID  - given sample of Trelegy on prior visit, not improved as per patient   - ordered PFT but never done, -reordered

## 2023-06-20 NOTE — ASSESSMENT & PLAN NOTE
- advised to take asa 81 mg qd, atorvastatin 80 mg qd   - c/w PRN SL Nitro  - restart lisinopril 10 mg qd, will help with cardiac remodelling as well as CKD  - however patient is currently normotensive, advised if patient have dizziness then to stop the medication

## 2023-06-20 NOTE — ASSESSMENT & PLAN NOTE
- will continue levothyroxine 25 mcg for now  - follow up TSH   - referred to endocrinologist prior visit, never made appointment

## 2023-06-21 NOTE — ASSESSMENT & PLAN NOTE
Patient with concerning history of hematochezia  However, currently hemodynamically stable, no dizziness and no lightheadedness,  However, patient is normotensive at this time, typically hypertension  Will check lab as noted below , rule out anemia  Advised if patient hematochezia continues then advised to present to the ER  Patient with positive cologuard, however could be false positive from blood  Denies any pain, less likely to be external hemorrhoids, could be internal hemorrhoid, however will need colonoscopy to rule out malignancy , will defer exam at this time as it will not   Also advised if patient have lightheadedness then to also present to the ER

## 2025-01-23 ENCOUNTER — APPOINTMENT (OUTPATIENT)
Dept: ENDOCRINOLOGY | Facility: CLINIC | Age: 65
End: 2025-01-23
Payer: MEDICARE